# Patient Record
Sex: MALE | Race: OTHER | NOT HISPANIC OR LATINO
[De-identification: names, ages, dates, MRNs, and addresses within clinical notes are randomized per-mention and may not be internally consistent; named-entity substitution may affect disease eponyms.]

---

## 2017-08-21 ENCOUNTER — APPOINTMENT (OUTPATIENT)
Dept: ULTRASOUND IMAGING | Facility: CLINIC | Age: 52
End: 2017-08-21
Payer: COMMERCIAL

## 2017-08-21 ENCOUNTER — OUTPATIENT (OUTPATIENT)
Dept: OUTPATIENT SERVICES | Facility: HOSPITAL | Age: 52
LOS: 1 days | End: 2017-08-21
Payer: COMMERCIAL

## 2017-08-21 DIAGNOSIS — Z00.8 ENCOUNTER FOR OTHER GENERAL EXAMINATION: ICD-10-CM

## 2017-08-21 PROCEDURE — 76882 US LMTD JT/FCL EVL NVASC XTR: CPT

## 2017-08-21 PROCEDURE — 76882 US LMTD JT/FCL EVL NVASC XTR: CPT | Mod: 26,LT

## 2018-08-19 ENCOUNTER — TRANSCRIPTION ENCOUNTER (OUTPATIENT)
Age: 53
End: 2018-08-19

## 2018-09-25 ENCOUNTER — OUTPATIENT (OUTPATIENT)
Dept: OUTPATIENT SERVICES | Facility: HOSPITAL | Age: 53
LOS: 1 days | End: 2018-09-25
Payer: COMMERCIAL

## 2018-09-25 ENCOUNTER — APPOINTMENT (OUTPATIENT)
Dept: ULTRASOUND IMAGING | Facility: CLINIC | Age: 53
End: 2018-09-25
Payer: COMMERCIAL

## 2018-09-25 DIAGNOSIS — Z00.8 ENCOUNTER FOR OTHER GENERAL EXAMINATION: ICD-10-CM

## 2018-09-25 PROCEDURE — 76536 US EXAM OF HEAD AND NECK: CPT

## 2018-09-25 PROCEDURE — 76536 US EXAM OF HEAD AND NECK: CPT | Mod: 26

## 2021-02-10 ENCOUNTER — APPOINTMENT (OUTPATIENT)
Dept: ORTHOPEDIC SURGERY | Facility: CLINIC | Age: 56
End: 2021-02-10
Payer: COMMERCIAL

## 2021-02-10 VITALS — TEMPERATURE: 97.8 F

## 2021-02-10 VITALS
WEIGHT: 184 LBS | HEIGHT: 71 IN | BODY MASS INDEX: 25.76 KG/M2 | DIASTOLIC BLOOD PRESSURE: 84 MMHG | HEART RATE: 66 BPM | SYSTOLIC BLOOD PRESSURE: 128 MMHG

## 2021-02-10 PROCEDURE — 99204 OFFICE O/P NEW MOD 45 MIN: CPT

## 2021-02-10 PROCEDURE — 99072 ADDL SUPL MATRL&STAF TM PHE: CPT

## 2021-02-10 NOTE — DISCUSSION/SUMMARY
[de-identified] : We discussed further treatment options.  He will try some additional physical therapy and the lumbar spine MRI will be obtained given his worsening radiculopathy.  Follow-up afterwards.

## 2021-02-10 NOTE — HISTORY OF PRESENT ILLNESS
[de-identified] : Mr. ALYSON PATRICK  is a 55 year old male who presents with 5 weeks of right lumbar pain without any specific cause or trauma.   When it first happened he was bent over.  Today he is feeling like he can straighten up.  He has seen his chiropractor and is taking mobic for symptom control.   Denies any LE radicular symptoms.  Normal bowel and bladder control.   Denies any recent fevers, chills, sweats, weight loss, or infection.\par \par The patients past medical history, past surgical history, medications, allergies, and social history were reviewed by me today with the patient and documented accordingly.  In addition, the patient's family history, which is noncontributory to their visit, was also reviewed.\par

## 2021-02-10 NOTE — PHYSICAL EXAM
[de-identified] : Examination of the lumbar spine reveals no midline tenderness palpation, step-offs, or skin lesions. Decreased range of motion with respect to flexion, extension, lateral bending, and rotation. No tenderness to palpation of the sciatic notch. No tenderness palpation of the bilateral greater trochanters. No pain with passive internal/external rotation of the hips. No instability of bilateral lower extremities.  Negative BELA. Negative straight leg raise bilaterally. No bowstring. Negative femoral stretch. 5 out of 5 iliopsoas, hip abductors, hips adductors, quadriceps, hamstrings, gastrocsoleus, tibialis anterior, extensor hallucis longus, peroneals. Grossly intact sensation to light touch bilateral lower extremities. 1+ patellar and Achilles reflexes. Downgoing Babinski. No clonus. Intact proprioception. Palpable pulses. No skin lesion and no edema on the right and left lower extremities. [de-identified] : Review of his lumbar x-rays reveals L3-4 isthmic spondylolisthesis

## 2021-02-16 ENCOUNTER — RESULT REVIEW (OUTPATIENT)
Age: 56
End: 2021-02-16

## 2021-02-16 ENCOUNTER — OUTPATIENT (OUTPATIENT)
Dept: OUTPATIENT SERVICES | Facility: HOSPITAL | Age: 56
LOS: 1 days | End: 2021-02-16
Payer: COMMERCIAL

## 2021-02-16 ENCOUNTER — APPOINTMENT (OUTPATIENT)
Dept: MRI IMAGING | Facility: HOSPITAL | Age: 56
End: 2021-02-16
Payer: COMMERCIAL

## 2021-02-16 DIAGNOSIS — Z00.8 ENCOUNTER FOR OTHER GENERAL EXAMINATION: ICD-10-CM

## 2021-02-16 PROCEDURE — 72148 MRI LUMBAR SPINE W/O DYE: CPT

## 2021-02-16 PROCEDURE — 72148 MRI LUMBAR SPINE W/O DYE: CPT | Mod: 26

## 2021-03-04 ENCOUNTER — NON-APPOINTMENT (OUTPATIENT)
Age: 56
End: 2021-03-04

## 2021-07-28 ENCOUNTER — APPOINTMENT (OUTPATIENT)
Dept: ORTHOPEDIC SURGERY | Facility: CLINIC | Age: 56
End: 2021-07-28
Payer: COMMERCIAL

## 2021-07-28 DIAGNOSIS — M54.16 RADICULOPATHY, LUMBAR REGION: ICD-10-CM

## 2021-07-28 DIAGNOSIS — M43.16 SPONDYLOLISTHESIS, LUMBAR REGION: ICD-10-CM

## 2021-07-28 DIAGNOSIS — M48.07 SPINAL STENOSIS, LUMBOSACRAL REGION: ICD-10-CM

## 2021-07-28 PROCEDURE — 99072 ADDL SUPL MATRL&STAF TM PHE: CPT

## 2021-07-28 PROCEDURE — 99214 OFFICE O/P EST MOD 30 MIN: CPT

## 2021-07-29 PROBLEM — M54.16 LUMBAR RADICULOPATHY: Status: ACTIVE | Noted: 2021-02-10

## 2021-07-29 PROBLEM — M43.16 SPONDYLOLISTHESIS OF LUMBAR REGION: Status: ACTIVE | Noted: 2021-02-10

## 2021-07-29 PROBLEM — M48.07 LUMBOSACRAL STENOSIS: Status: ACTIVE | Noted: 2021-07-29

## 2021-07-29 NOTE — DISCUSSION/SUMMARY
[de-identified] : We discussed further treatment options both nonsurgical and surgical. He is not interested in surgical intervention. He would like to be evaluated for possible injection based therapies. He will be referred for this. Follow-up afterwards or sooner with any changes or worsening of his symptoms.

## 2021-07-29 NOTE — PHYSICAL EXAM
[de-identified] : Examination of the lumbar spine reveals no midline tenderness palpation, step-offs, or skin lesions. Decreased range of motion with respect to flexion, extension, lateral bending, and rotation. No tenderness to palpation of the sciatic notch. No tenderness palpation of the bilateral greater trochanters. No pain with passive internal/external rotation of the hips. No instability of bilateral lower extremities.  Negative BELA. Negative straight leg raise bilaterally. No bowstring. Negative femoral stretch. 5 out of 5 iliopsoas, hip abductors, hips adductors, quadriceps, hamstrings, gastrocsoleus, tibialis anterior, extensor hallucis longus, peroneals. Grossly intact sensation to light touch bilateral lower extremities. 1+ patellar and Achilles reflexes. Downgoing Babinski. No clonus. Intact proprioception. Palpable pulses. No skin lesion and no edema on the right and left lower extremities. [de-identified] : Review of his lumbar x-rays reveals L3-4 isthmic spondylolisthesis\par \par Lumbar MRI reveals degenerative disc disease L3-5. L3-4 spondylolisthesis. There is some increased stenosis as well.

## 2021-07-29 NOTE — HISTORY OF PRESENT ILLNESS
[de-identified] : Patient returns for follow-up today. He has had a lumbar spine MRI. He continues to have symptoms.\par The patients past medical history, past surgical history, medications, allergies, and social history were reviewed by me today with the patient and documented accordingly.  In addition, the patient's family history, which is noncontributory to their visit, was also reviewed.\par

## 2022-06-06 ENCOUNTER — APPOINTMENT (OUTPATIENT)
Dept: MRI IMAGING | Facility: CLINIC | Age: 57
End: 2022-06-06

## 2022-06-06 ENCOUNTER — APPOINTMENT (OUTPATIENT)
Dept: RADIOLOGY | Facility: CLINIC | Age: 57
End: 2022-06-06

## 2022-06-14 ENCOUNTER — OUTPATIENT (OUTPATIENT)
Dept: OUTPATIENT SERVICES | Facility: HOSPITAL | Age: 57
LOS: 1 days | End: 2022-06-14
Payer: COMMERCIAL

## 2022-06-14 ENCOUNTER — APPOINTMENT (OUTPATIENT)
Dept: MRI IMAGING | Facility: IMAGING CENTER | Age: 57
End: 2022-06-14
Payer: COMMERCIAL

## 2022-06-14 DIAGNOSIS — Z00.8 ENCOUNTER FOR OTHER GENERAL EXAMINATION: ICD-10-CM

## 2022-06-14 PROCEDURE — 72148 MRI LUMBAR SPINE W/O DYE: CPT | Mod: 26

## 2022-06-14 PROCEDURE — 72148 MRI LUMBAR SPINE W/O DYE: CPT

## 2022-12-07 ENCOUNTER — NON-APPOINTMENT (OUTPATIENT)
Age: 57
End: 2022-12-07

## 2024-08-12 ENCOUNTER — APPOINTMENT (OUTPATIENT)
Dept: ULTRASOUND IMAGING | Facility: CLINIC | Age: 59
End: 2024-08-12
Payer: COMMERCIAL

## 2024-08-12 PROCEDURE — 76882 US LMTD JT/FCL EVL NVASC XTR: CPT | Mod: 26,LT

## 2025-08-26 ENCOUNTER — EMERGENCY (EMERGENCY)
Facility: HOSPITAL | Age: 60
LOS: 1 days | End: 2025-08-26
Attending: STUDENT IN AN ORGANIZED HEALTH CARE EDUCATION/TRAINING PROGRAM
Payer: COMMERCIAL

## 2025-08-26 VITALS
SYSTOLIC BLOOD PRESSURE: 146 MMHG | HEIGHT: 71 IN | TEMPERATURE: 99 F | OXYGEN SATURATION: 96 % | DIASTOLIC BLOOD PRESSURE: 89 MMHG | WEIGHT: 184.97 LBS | HEART RATE: 94 BPM | RESPIRATION RATE: 16 BRPM

## 2025-08-26 VITALS
TEMPERATURE: 99 F | DIASTOLIC BLOOD PRESSURE: 85 MMHG | RESPIRATION RATE: 18 BRPM | OXYGEN SATURATION: 97 % | SYSTOLIC BLOOD PRESSURE: 138 MMHG | HEART RATE: 90 BPM

## 2025-08-26 LAB
ALBUMIN SERPL ELPH-MCNC: 4.3 G/DL — SIGNIFICANT CHANGE UP (ref 3.3–5)
ALP SERPL-CCNC: 82 U/L — SIGNIFICANT CHANGE UP (ref 40–120)
ALT FLD-CCNC: 20 U/L — SIGNIFICANT CHANGE UP (ref 10–45)
ANION GAP SERPL CALC-SCNC: 14 MMOL/L — SIGNIFICANT CHANGE UP (ref 5–17)
APTT BLD: 29.9 SEC — SIGNIFICANT CHANGE UP (ref 26.1–36.8)
APTT BLD: 33.8 SEC — SIGNIFICANT CHANGE UP (ref 26.1–36.8)
AST SERPL-CCNC: 22 U/L — SIGNIFICANT CHANGE UP (ref 10–40)
BASOPHILS # BLD AUTO: 0.02 K/UL — SIGNIFICANT CHANGE UP (ref 0–0.2)
BASOPHILS # BLD MANUAL: 0.09 K/UL — SIGNIFICANT CHANGE UP (ref 0–0.2)
BASOPHILS NFR BLD AUTO: 0.2 % — SIGNIFICANT CHANGE UP (ref 0–2)
BASOPHILS NFR BLD MANUAL: 0.9 % — SIGNIFICANT CHANGE UP (ref 0–2)
BILIRUB SERPL-MCNC: 1.7 MG/DL — HIGH (ref 0.2–1.2)
BUN SERPL-MCNC: 14 MG/DL — SIGNIFICANT CHANGE UP (ref 7–23)
CALCIUM SERPL-MCNC: 9.8 MG/DL — SIGNIFICANT CHANGE UP (ref 8.4–10.5)
CHLORIDE SERPL-SCNC: 102 MMOL/L — SIGNIFICANT CHANGE UP (ref 96–108)
CO2 SERPL-SCNC: 23 MMOL/L — SIGNIFICANT CHANGE UP (ref 22–31)
CREAT SERPL-MCNC: 0.87 MG/DL — SIGNIFICANT CHANGE UP (ref 0.5–1.3)
DACRYOCYTES BLD QL SMEAR: SLIGHT — SIGNIFICANT CHANGE UP
EGFR: 99 ML/MIN/1.73M2 — SIGNIFICANT CHANGE UP
EGFR: 99 ML/MIN/1.73M2 — SIGNIFICANT CHANGE UP
ELLIPTOCYTES BLD QL SMEAR: SLIGHT — SIGNIFICANT CHANGE UP
EOSINOPHIL # BLD AUTO: 0.04 K/UL — SIGNIFICANT CHANGE UP (ref 0–0.5)
EOSINOPHIL # BLD MANUAL: 0 K/UL — SIGNIFICANT CHANGE UP (ref 0–0.5)
EOSINOPHIL NFR BLD AUTO: 0.4 % — SIGNIFICANT CHANGE UP (ref 0–6)
EOSINOPHIL NFR BLD MANUAL: 0 % — SIGNIFICANT CHANGE UP (ref 0–6)
FLUAV AG NPH QL: SIGNIFICANT CHANGE UP
FLUBV AG NPH QL: SIGNIFICANT CHANGE UP
GAS PNL BLDV: SIGNIFICANT CHANGE UP
GLUCOSE SERPL-MCNC: 110 MG/DL — HIGH (ref 70–99)
HCT VFR BLD CALC: 45 % — SIGNIFICANT CHANGE UP (ref 39–50)
HGB BLD-MCNC: 13.6 G/DL — SIGNIFICANT CHANGE UP (ref 13–17)
IMM GRANULOCYTES # BLD AUTO: 0.08 K/UL — HIGH (ref 0–0.07)
IMM GRANULOCYTES NFR BLD AUTO: 0.8 % — SIGNIFICANT CHANGE UP (ref 0–0.9)
INR BLD: 1.03 RATIO — SIGNIFICANT CHANGE UP (ref 0.85–1.16)
INR BLD: 1.08 RATIO — SIGNIFICANT CHANGE UP (ref 0.85–1.16)
LIDOCAIN IGE QN: 27 U/L — SIGNIFICANT CHANGE UP (ref 7–60)
LYMPHOCYTES # BLD AUTO: 0.68 K/UL — LOW (ref 1–3.3)
LYMPHOCYTES # BLD MANUAL: 0.53 K/UL — LOW (ref 1–3.3)
LYMPHOCYTES NFR BLD AUTO: 6.8 % — LOW (ref 13–44)
LYMPHOCYTES NFR BLD MANUAL: 5.3 % — LOW (ref 13–44)
MANUAL NEUTROPHIL BANDS #: 0.18 K/UL — SIGNIFICANT CHANGE UP (ref 0–0.84)
MCHC RBC-ENTMCNC: 18.5 PG — LOW (ref 27–34)
MCHC RBC-ENTMCNC: 30.2 G/DL — LOW (ref 32–36)
MCV RBC AUTO: 61.3 FL — LOW (ref 80–100)
MONOCYTES # BLD AUTO: 0.58 K/UL — SIGNIFICANT CHANGE UP (ref 0–0.9)
MONOCYTES # BLD MANUAL: 0.27 K/UL — SIGNIFICANT CHANGE UP (ref 0–0.9)
MONOCYTES NFR BLD AUTO: 5.8 % — SIGNIFICANT CHANGE UP (ref 2–14)
MONOCYTES NFR BLD MANUAL: 2.7 % — SIGNIFICANT CHANGE UP (ref 2–14)
NEUTROPHILS # BLD AUTO: 8.56 K/UL — HIGH (ref 1.8–7.4)
NEUTROPHILS # BLD MANUAL: 8.89 K/UL — HIGH (ref 1.8–7.4)
NEUTROPHILS NFR BLD AUTO: 86 % — HIGH (ref 43–77)
NEUTROPHILS NFR BLD MANUAL: 89.3 % — HIGH (ref 43–77)
NEUTS BAND # BLD: 1.8 % — SIGNIFICANT CHANGE UP (ref 0–8)
NEUTS BAND NFR BLD: 1.8 % — SIGNIFICANT CHANGE UP (ref 0–8)
NRBC # BLD AUTO: 0 K/UL — SIGNIFICANT CHANGE UP (ref 0–0)
NRBC # FLD: 0 K/UL — SIGNIFICANT CHANGE UP (ref 0–0)
NRBC BLD AUTO-RTO: 0 /100 WBCS — SIGNIFICANT CHANGE UP (ref 0–0)
PLAT MORPH BLD: NORMAL — SIGNIFICANT CHANGE UP
PLATELET # BLD AUTO: 217 K/UL — SIGNIFICANT CHANGE UP (ref 150–400)
PMV BLD: 9.6 FL — SIGNIFICANT CHANGE UP (ref 7–13)
POIKILOCYTOSIS BLD QL AUTO: SLIGHT — SIGNIFICANT CHANGE UP
POLYCHROMASIA BLD QL SMEAR: SLIGHT — SIGNIFICANT CHANGE UP
POTASSIUM SERPL-MCNC: 3.9 MMOL/L — SIGNIFICANT CHANGE UP (ref 3.5–5.3)
POTASSIUM SERPL-SCNC: 3.9 MMOL/L — SIGNIFICANT CHANGE UP (ref 3.5–5.3)
PROT SERPL-MCNC: 7.1 G/DL — SIGNIFICANT CHANGE UP (ref 6–8.3)
PROTHROM AB SERPL-ACNC: 11.9 SEC — SIGNIFICANT CHANGE UP (ref 9.9–13.4)
PROTHROM AB SERPL-ACNC: 12.5 SEC — SIGNIFICANT CHANGE UP (ref 9.9–13.4)
RBC # BLD: 7.34 M/UL — HIGH (ref 4.2–5.8)
RBC # FLD: 18 % — HIGH (ref 10.3–14.5)
RBC BLD AUTO: ABNORMAL
RSV RNA NPH QL NAA+NON-PROBE: SIGNIFICANT CHANGE UP
SARS-COV-2 RNA SPEC QL NAA+PROBE: DETECTED
SODIUM SERPL-SCNC: 139 MMOL/L — SIGNIFICANT CHANGE UP (ref 135–145)
SOURCE RESPIRATORY: SIGNIFICANT CHANGE UP
TARGETS BLD QL SMEAR: SLIGHT — SIGNIFICANT CHANGE UP
WBC # BLD: 9.96 K/UL — SIGNIFICANT CHANGE UP (ref 3.8–10.5)
WBC # FLD AUTO: 9.96 K/UL — SIGNIFICANT CHANGE UP (ref 3.8–10.5)

## 2025-08-26 PROCEDURE — 73610 X-RAY EXAM OF ANKLE: CPT | Mod: 26,LT

## 2025-08-26 PROCEDURE — 80053 COMPREHEN METABOLIC PANEL: CPT

## 2025-08-26 PROCEDURE — 74177 CT ABD & PELVIS W/CONTRAST: CPT | Mod: 26

## 2025-08-26 PROCEDURE — 72131 CT LUMBAR SPINE W/O DYE: CPT | Mod: 26

## 2025-08-26 PROCEDURE — 73090 X-RAY EXAM OF FOREARM: CPT | Mod: 26,LT

## 2025-08-26 PROCEDURE — 82947 ASSAY GLUCOSE BLOOD QUANT: CPT

## 2025-08-26 PROCEDURE — 84132 ASSAY OF SERUM POTASSIUM: CPT

## 2025-08-26 PROCEDURE — 85018 HEMOGLOBIN: CPT

## 2025-08-26 PROCEDURE — 71260 CT THORAX DX C+: CPT | Mod: 26

## 2025-08-26 PROCEDURE — 99285 EMERGENCY DEPT VISIT HI MDM: CPT | Mod: 25

## 2025-08-26 PROCEDURE — 87637 SARSCOV2&INF A&B&RSV AMP PRB: CPT

## 2025-08-26 PROCEDURE — 84295 ASSAY OF SERUM SODIUM: CPT

## 2025-08-26 PROCEDURE — 85610 PROTHROMBIN TIME: CPT

## 2025-08-26 PROCEDURE — 85730 THROMBOPLASTIN TIME PARTIAL: CPT

## 2025-08-26 PROCEDURE — 73610 X-RAY EXAM OF ANKLE: CPT

## 2025-08-26 PROCEDURE — 83605 ASSAY OF LACTIC ACID: CPT

## 2025-08-26 PROCEDURE — 93010 ELECTROCARDIOGRAM REPORT: CPT

## 2025-08-26 PROCEDURE — 82435 ASSAY OF BLOOD CHLORIDE: CPT

## 2025-08-26 PROCEDURE — 99285 EMERGENCY DEPT VISIT HI MDM: CPT

## 2025-08-26 PROCEDURE — 90715 TDAP VACCINE 7 YRS/> IM: CPT

## 2025-08-26 PROCEDURE — 74177 CT ABD & PELVIS W/CONTRAST: CPT

## 2025-08-26 PROCEDURE — 85025 COMPLETE CBC W/AUTO DIFF WBC: CPT

## 2025-08-26 PROCEDURE — 71260 CT THORAX DX C+: CPT

## 2025-08-26 PROCEDURE — 71045 X-RAY EXAM CHEST 1 VIEW: CPT

## 2025-08-26 PROCEDURE — 96374 THER/PROPH/DIAG INJ IV PUSH: CPT | Mod: XU

## 2025-08-26 PROCEDURE — 70450 CT HEAD/BRAIN W/O DYE: CPT

## 2025-08-26 PROCEDURE — 83690 ASSAY OF LIPASE: CPT

## 2025-08-26 PROCEDURE — 85014 HEMATOCRIT: CPT

## 2025-08-26 PROCEDURE — 71045 X-RAY EXAM CHEST 1 VIEW: CPT | Mod: 26

## 2025-08-26 PROCEDURE — 93005 ELECTROCARDIOGRAM TRACING: CPT

## 2025-08-26 PROCEDURE — 70450 CT HEAD/BRAIN W/O DYE: CPT | Mod: 26

## 2025-08-26 PROCEDURE — 87040 BLOOD CULTURE FOR BACTERIA: CPT

## 2025-08-26 PROCEDURE — 82803 BLOOD GASES ANY COMBINATION: CPT

## 2025-08-26 PROCEDURE — 82330 ASSAY OF CALCIUM: CPT

## 2025-08-26 PROCEDURE — 90471 IMMUNIZATION ADMIN: CPT

## 2025-08-26 PROCEDURE — 73090 X-RAY EXAM OF FOREARM: CPT

## 2025-08-26 RX ORDER — ACETAMINOPHEN 500 MG/5ML
1000 LIQUID (ML) ORAL ONCE
Refills: 0 | Status: COMPLETED | OUTPATIENT
Start: 2025-08-26 | End: 2025-08-26

## 2025-08-26 RX ORDER — KETOROLAC TROMETHAMINE 30 MG/ML
15 INJECTION, SOLUTION INTRAMUSCULAR; INTRAVENOUS ONCE
Refills: 0 | Status: DISCONTINUED | OUTPATIENT
Start: 2025-08-26 | End: 2025-08-26

## 2025-08-26 RX ORDER — LIDOCAINE HYDROCHLORIDE 20 MG/ML
1 JELLY TOPICAL ONCE
Refills: 0 | Status: COMPLETED | OUTPATIENT
Start: 2025-08-26 | End: 2025-08-26

## 2025-08-26 RX ADMIN — Medication 400 MILLIGRAM(S): at 12:53

## 2025-08-26 RX ADMIN — LIDOCAINE HYDROCHLORIDE 1 PATCH: 20 JELLY TOPICAL at 12:53

## 2025-08-26 RX ADMIN — Medication 1000 MILLILITER(S): at 17:48

## 2025-08-31 LAB
CULTURE RESULTS: SIGNIFICANT CHANGE UP
CULTURE RESULTS: SIGNIFICANT CHANGE UP
SPECIMEN SOURCE: SIGNIFICANT CHANGE UP
SPECIMEN SOURCE: SIGNIFICANT CHANGE UP